# Patient Record
Sex: FEMALE | Race: WHITE | Employment: UNEMPLOYED | URBAN - METROPOLITAN AREA
[De-identification: names, ages, dates, MRNs, and addresses within clinical notes are randomized per-mention and may not be internally consistent; named-entity substitution may affect disease eponyms.]

---

## 2017-04-27 ENCOUNTER — HOSPITAL ENCOUNTER (OUTPATIENT)
Dept: PHYSICAL THERAPY | Age: 52
Discharge: HOME | End: 2017-04-27
Payer: COMMERCIAL

## 2017-04-27 PROCEDURE — 97162 PT EVAL MOD COMPLEX 30 MIN: CPT

## 2017-04-27 PROCEDURE — 97530 THERAPEUTIC ACTIVITIES: CPT

## 2017-04-27 NOTE — PROGRESS NOTES
PF EVALUATION/TREATMENT NOTE     Patient Name: Ashlie Min  Date:2017  : 1965  [x]  Patient  Verified  Payor: Pauline Alford / Plan: Mitch Adan / Product Type: HMO /    In time:500  Out time:600  Total Treatment Time (min): 60    Visit #: 1 of 8    Treatment Area: [x] Pelvic Floor     [] Other:    SUBJECTIVE  Any medication changes, allergies to medications, adverse drug reactions, diagnosis change, or new procedure performed?: [x] No    [] Yes (see summary sheet for update)    Pain Level : At Present: 0/10, At [facility] 0/10, At Worst 0/10   [] Constant []Intermittent    []Improving  [x]Staying the Same  []Getting worse    Current symptoms/Complaints: Prior to January was having diarrhea twice daily, no leaking, frequent urge especially around meals. Went off Metformin (which she believes caused it) in January and saw onset of feeling \"full\"  Requiring strain and leaning back to void began in January, at first with constipation and use of exlax and had stomach pressure. Was having blood from hemorrhoids d/t strain, felt like the whole bottom was going to blow out (like a balloon coming out)   A month ago began Metamucil and increased regularity of void and less abdominal pain, but increased fullness.      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), color/crafts, being outside/yardwork  Limitations to PLOF: metformin had her feeling like she couldn't do anything   Mechanism of Injury: patient relates it to medicine  Previous Treatment/Compliance: Metamucil daily with relief      Work Hx: Housewife  Living Situation:  at home, 2 grown kids at home   Domestic Life: housework  Activity/Recreational Limitations: not     Pt Goals: poop  Barriers:   [x]N/A []pain []financial []time []transportation []other  Motivation: good  Substance use:  [x]N/A  []Alcohol []Tobacco []other:   FABQ Score: []low [x]elevate 43  Cognition: A & O x 4    Other:  Clinical Presentation:   [] Stable  [x]Evolving/Changing   [] Unstable/unpredictable     OBJECTIVE     Pelvic Floor Dysfunction Evaluation    Musculoskeletal Screen:    Skin Integrity:  [x] Healthy Reported thinning noted     Sensation: [x] Intact [] Diminished:        Prolapse: [x] firm urethra  [] Rectocele:    Vaginal  PERF Score (Performance/Endurance/Repetitions/Flicks)   P: 1 E:2 R: 4 F: 3 Total:glut squeeze     Rectal:  Poor awareness, able to contract with stuttered activation, improved on third attempt but unable to maintain     Accessory Muscle Use: add, gluts, breath    Patient has failed previous pelvic floor muscle training? [] Yes    [x] No    Lumbar Screen: full flexion, limited gapping lumbar lower  ROM deficits: decreased right ? ER>IR  Breath Patterns: upper chest, cues for improved diaphragm  Palpation: right diaphragm, left of center lower abdomen, right > left adductors  Internal vaginal left deep pelvic floor, rectal did well - left greater than right tension   Daily Water Intake: 6-8 glasses of water, coffee once in a while (decaf), unsweet tea  Bowel Habits: daily with much strain,     30 min [x]Eval                  []Re-Eval            15 min Therapeutic Activity:  [x]  See HEP  sheet :  Pt instructed in pelvic floor anatomy/function related to   Constipation,  VOIDING     Patient also instructed in behavior modifications relative to symptoms including dietary intake, bed mobility/body mechanics. Educated in use of step stool for foot support during defecation to facilitate relaxation of NC mm as indicated.           []  Increase Tissue extensibility        []  Assess fiber intake    []  Assess voiding habits  []  Assess bowel habits  []  Other:   Rationale: improve coordination, increase proprioception and pelvic awareness   to improve the patients ability to regain proper voiding       15 min Neuromuscular Re-education:  [x]  See flow sheet :  Self stretches to inhibit tone at hips/pelvic; breathing for same   []  Pelvic floor strengthening                 [x]  Pelvic floor downtraining  []  Quality pelvic floor contractions       [x]  Relaxation techniques  []  Urge suppression exercises  []  Other:  Rationale: improve coordination and increase proprioception  to improve the patients ability to regain voiding mechanics             With   [] TE   [x] TA   [x] neuro  [] manual   [] other: Patient Education: [x] Review HEP    [] Progressed/Changed HEP based on:   [] positioning   [] body mechanics   [] transfers   [] heat/ice application    [] other:      Other Objective/Functional Measures:     Pain Level (0-10 scale) post treatment: 0    ASSESSMENT:     [x]  See Plan of Care  []  See progress note/recertification  []  See Discharge Summary         Progress towards goals / Updated goals:  Short Term Goals: To be accomplished in 4  weeks:  1. Patient will demonstrate home exercise program accurately as adjunct to PT clinic visits to promote healthy lifestyle and increase quality of life. 2. Patient will verbalize proper voiding mechanics, as well complete bowel logs  3. Patient will perform biofeedback for further assessment and retraining of PF for ease of voiding     Long Term Goals: To be accomplished in 8  weeks:  1. Patient will have FOTO score of 59 points indicating improvement in function(49 at eval)  2. Patient will demonstrate proper PF activation as tested rectally to allow coordinated  activation and hold with full relaxation to allow awareness required for proper voiding  3.  Patient will report 75% decrease in symptoms to allow improved voiding without strain or pressure       PLAN  []  Upgrade activities as tolerated     [x]  Continue plan of care  []  Update interventions per flow sheet       [] Discharge due to:_  []  Other:_      Naveed Kendall, PT 4/27/2017  5:14 PM

## 2017-04-27 NOTE — PROGRESS NOTES
[facility] [address], [address] [phone] fax    Plan of Care/ Statement of Necessity for Physical Therapy Services    Patient name: Marla Alfaro Start of Care: 2017   Referral source: Sheryl Camejo MD : 1965    Medical Diagnosis: Outlet dysfunction constipation [K59.02]   Onset Date:2017    Treatment Diagnosis: pelvic floor dyscoordination   Prior Hospitalization: see medical history Provider#: [id number]   Medications: Verified on Patient summary List    Comorbidities: PMHx/Surgical Hx: 2 c-sections 94,96, hysterectomy 08 hormone therapy x 1 year  DM ~ 7 years, HTN, depression/anxity    Prior Level of Function: walking 1 1/2 miles a day to control sugars, 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 Assessment/ key information: Patient is a 46 y. o.female presenting with Outlet dysfunction constipation [K59.02]. Ms. Karen Bolaños was a pleasure to have evaluated for complaints of \"fullness\" and strain with voiding over the last 4 month. She notes history of diarrhea while on Metformin, and when taken off saw a period of constipation and pressure at abdomen, improving over the last month with use of fiber supplement. Presently she is voiding once daily, but with a great deal of strain and discomfort. Evaluation revealed abdominal soft tissue restrictions, limited hip joint and soft tissue mobility and tightness through pelvic floor. Intrarectal evaluation revealed poor activation with difficulty on full relaxation. She is eager to improve and willing to work with PT to address voiding mechanics, soft tissue restrictions and utilize biofeedback for pelvic floor reeducation.   Patient will benefit from skilled PT services to address deficits and facilitate return to premorbid activity level and promote improved quality of life. Evaluation Complexity History HIGH Complexity :3+ comorbidities / personal factors will impact the outcome/ POC ; Examination MEDIUM Complexity : 3 Standardized tests and measures addressing body structure, function, activity limitation and / or participation in recreation  ;Presentation MEDIUM Complexity : Evolving with changing characteristics  ; Clinical Decision Making MEDIUM Complexity : FOTO score of 26-74  Overall Complexity Rating: MEDIUM    Problem List: Pelvic pain/dysfunction, Decreased pelvic floor mm awareness, Decreased pelvic floor mm strength, Use of accessory muscles, Improper voiding habits, Hypertonus of pelvic floor and Urinary urgency    Treatment Plan may include any combination of the following:   Therapeutic exercise, Urge suppression techniques, Neuromuscular re-education, Manual therapy, Physical agent/modality and Patient education  Patient / Family readiness to learn indicated by: asking questions, trying to perform skills and interest    Persons(s) to be included in education: patient (P)    Barriers to Learning/Limitations: None    Patient Goal (s): poop    Patient Self Reported Health Status: good    Rehabilitation Potential: good    Short Term Goals: To be accomplished in 4  weeks:  1. Patient will demonstrate home exercise program accurately as adjunct to PT clinic visits to promote healthy lifestyle and increase quality of life. 2. Patient will verbalize proper voiding mechanics, as well complete bowel logs  3. Patient will perform biofeedback for further assessment and retraining of PF for ease of voiding     Long Term Goals: To be accomplished in 8  weeks:  1. Patient will have FOTO score of 59 points indicating improvement in function(49 at eval)  2. Patient will demonstrate proper PF activation as tested rectally to allow coordinated  activation and hold with full relaxation to allow awareness required for proper voiding  3.  Patient will report 75% decrease in symptoms to allow improved voiding without strain or pressure     Frequency / Duration: Patient to be seen 1-2 times per week for 8 weeks. Patient/ Caregiver education and instruction: Diagnosis, prognosis, Proper Voiding Habits, Diet, Pain Management, Exercises and Bladder Retraining   [x]  Plan of care has been reviewed with ANNA Severino, PT 4/27/2017 5:13 PM    ________________________________________________________________________    I certify that the above Therapy Services are being furnished while the patient is under my care. I agree with the treatment plan and certify that this therapy is necessary.     Physician's Signature:___________________  Date:____________Time: _________    Please sign and return to [facility] [address]   [facility], [address] [phone] fax

## 2017-04-27 NOTE — MR AVS SNAPSHOT
Visit Information Date & Time Provider Department Dept. Phone Encounter #  
 4/27/2017  5:00 PM Ronni Gann, PT [facility] PT [facility]  [phone] [id number] Upcoming Health Maintenance Date Due Hepatitis C Screening 1965 DTaP/Tdap/Td series (1 - Tdap) 5/15/1984 PAP AKA CERVICAL CYTOLOGY 5/15/1984 BREAST CANCER SCRN MAMMOGRAM 5/15/2013 FOBT Q 1 YEAR AGE 50-75 5/15/2013 INFLUENZA AGE 9 TO ADULT 8/1/2016 Allergies as of 4/27/2017  Never Reviewed Not on File Current Immunizations  Never Reviewed No immunizations on file. Not reviewed this visit Your Updated Medication List  
  
Notice Cannot display patient medications because the patient has not yet been checked in. To-Do List   
 04/27/2017  5:00 PM  
  Appointment with Ronni Gann PT at [facility] PT [facility] ([phone]) Introducing hospitals SERVICES! [employer] introduces [employer] patient portal. Now you can access parts of your medical record, email your doctor's office, and request medication refills online. 1. In your internet browser, go to [email or web address] 2. Click on the First Time User? Click Here link in the Sign In box. You will see the New Member Sign Up page. 3. Enter your [employer] Access Code exactly as it appears below. You will not need to use this code after youve completed the sign-up process. If you do not sign up before the expiration date, you must request a new code. · [employer] Access Code: [email or web address] Expires: 7/13/2017 12:40 PM 
 
4. Enter the last four digits of your Social Security Number (xxxx) and Date of Birth (mm/dd/yyyy) as indicated and click Submit. You will be taken to the next sign-up page. 5. Create a [employer] ID. This will be your [employer] login ID and cannot be changed, so think of one that is secure and easy to remember. 6. Create a [employer] password. You can change your password at any time. 7. Enter your Password Reset Question and Answer. This can be used at a later time if you forget your password. 8. Enter your e-mail address. You will receive e-mail notification when new information is available in [address] 9. Click Sign Up. You can now view and download portions of your medical record. 10. Click the Download Summary menu link to download a portable copy of your medical information. If you have questions, please visit the Frequently Asked Questions section of the [employer] website. Remember, [employer] is NOT to be used for urgent needs. For medical emergencies, dial 911. Now available from your iPhone and Android! Please provide this summary of care documentation to your next provider. Your primary care clinician is listed as GREG ESPINOZA. If you have any questions after today's visit, please call [phone].

## 2017-05-02 ENCOUNTER — HOSPITAL ENCOUNTER (OUTPATIENT)
Dept: PHYSICAL THERAPY | Age: 52
Discharge: HOME | End: 2017-05-02
Payer: COMMERCIAL

## 2017-05-02 PROCEDURE — 97530 THERAPEUTIC ACTIVITIES: CPT

## 2017-05-02 PROCEDURE — 97112 NEUROMUSCULAR REEDUCATION: CPT

## 2017-05-09 ENCOUNTER — HOSPITAL ENCOUNTER (OUTPATIENT)
Dept: PHYSICAL THERAPY | Age: 52
Discharge: HOME | End: 2017-05-09
Payer: COMMERCIAL

## 2017-05-09 PROCEDURE — 97110 THERAPEUTIC EXERCISES: CPT

## 2017-05-09 PROCEDURE — 97112 NEUROMUSCULAR REEDUCATION: CPT

## 2017-05-09 NOTE — PROGRESS NOTES
PF DAILY TREATMENT NOTE 3-16    Patient Name: Seema Peralta  Date:2017  : 1965  [x]  Patient  Verified  Payor: Adry Guzman / Plan: Tucker Challenger / Product Type: HMO /    In time:1025  Out time:1115  Total Treatment Time (min): 40  Total Timed Codes (min): 40  1:1 Treatment Time ( only):    Visit #: 3 of 8    Treatment Area: [] Pelvic Floor     [] Other:    SUBJECTIVE  Pain Level (0-10 scale): 3/10, pain everywhere  Any medication changes, allergies to medications, adverse drug reactions, diagnosis change, or new procedure performed?: [x] No    [] Yes (see summary sheet for update)  Subjective functional status/changes:   [] No changes reported  Pt reports that she has bowel movements daily but has frequent trips to urinate as well (as confirmed by bladder logs). Pt reports that she always stops at the bathroom before she leaves the house. She adds that she has been only doing a little bit of straining to get her bowel movement going as they are still really firm.     OBJECTIVE          15 min Therapeutic Activity:  []  See flow sheet :    []  Increase Tissue extensibility        []  Assess fiber intake    [x]  Assess voiding habits  [x]  Assess bowel habits  [x]  Other: Relaxed voids, urge suppression  Rationale: improve coordination and increase proprioception  to improve the patients ability to improve pelvic floor relaxation to promote ease of voids      25 min Neuromuscular Re-education:  []  See flow sheet :   [x]  Pelvic floor strengthening                 [x]  Pelvic floor downtraining  [x]  Quality pelvic floor contractions       [x]  Relaxation techniques  [x]  Urge suppression exercises  [x]  Other:biofeedback, guided relaxation, toilet simulation  Rationale: increase ROM, improve coordination and increase proprioception  to improve the patients ability to facilitate ease of voiding, both bladder and bowel          With   [] TE   [x] TA   [x] neuro  [] manual   [] other: Patient Education: [x] Review HEP    [] Progressed/Changed HEP based on:   [x] positioning   [x] body mechanics   [] transfers   [] heat/ice application    [] other:      Other Objective/Functional Measures:   []baseline resting tone: .6  []slow twitch mms 3.8  []fast twitch mms    Pain Level (0-10 scale) post treatment: 1/10    ASSESSMENT/Changes in Function: Pt continues to demonstrate difficulty with relaxation however following today's biofeedback and guided breathing, relaxation, pt was able to report improved relaxation and therapist noted improved breathing. She also reported decreased pain throughout her body    []  Decrease # of leaks   [] No change []  Improving [] Resolved     []  Decrease hypertonus [] No change []  Improving [] Resolved     []  Increase void interval [] No change []  Improving [] Resolved     []  Increase PF strength [] No change []  Improving [] Resolved     []  Increase PF endurance [] No change []  Improving [] Resolved     []  Increase endurance [] No change []  Improving [] Resolved     []  Decrease # of pads [] No change []  Improving [] Resolved     []  Decrease pain [] No change []  Improving [] Resolved     [x]  Increased coordination [] No change [x]  Improving [] Resolved     [x]  Increased Bowel Frequency [] No change [x]  Improving [] Resolved       Patient will continue to benefit from skilled PT services to address functional mobility deficits, address ROM deficits, address strength deficits and analyze and address soft tissue restrictions to attain remaining goals. []  See Plan of Care  []  See progress note/recertification  []  See Discharge Summary         Progress towards goals / Updated goals:  Short Term Goals: To be accomplished in 4 weeks:  1. Patient will demonstrate home exercise program accurately as adjunct to PT clinic visits to promote healthy lifestyle and increase quality of life. Current: Pr reports compliance   2.  Patient will verbalize proper voiding mechanics, as well complete bowel logs  Current: Pt returned bowel logs and verbalizes proper voiding mechanics, progressing however still requires guidance as still straining  3. Patient will perform biofeedback for further assessment and retraining of PF for ease of voiding   Current: Pt performed biofeedback today with improved understanding of relaxed voids      Long Term Goals: To be accomplished in 8 weeks:  1. Patient will have FOTO score of 59 points indicating improvement in function(49 at eval)  2. Patient will demonstrate proper PF activation as tested rectally to allow coordinated activation and hold with full relaxation to allow awareness required for proper voiding  Current: will assess at next visit however pt demonstrates good relaxation in supine and seated positions while completing biofeedback  3. Patient will report 75% decrease in symptoms to allow improved voiding without strain or pressure   Current: Pt reported straining this morning but tried to do less.      PLAN  []  Upgrade activities as tolerated     [x]  Continue plan of care  []  Update interventions per flow sheet       []  Discharge due to:_  []  Other:_      Cindy Renee PTA 5/9/2017  10:56 AM    Future Appointments  Date Time Provider Magdiel Elliott   5/18/2017 9:00 AM Sachin Buenrostro, PT [facility]   5/23/2017 10:30 AM Cindy Renee PTA [facility]   5/30/2017 10:30 AM Cindy Renee PTA [facility]

## 2017-05-18 ENCOUNTER — HOSPITAL ENCOUNTER (OUTPATIENT)
Dept: PHYSICAL THERAPY | Age: 52
Discharge: HOME | End: 2017-05-18
Payer: COMMERCIAL

## 2017-05-18 PROCEDURE — 97530 THERAPEUTIC ACTIVITIES: CPT

## 2017-05-18 PROCEDURE — 97112 NEUROMUSCULAR REEDUCATION: CPT

## 2017-05-18 NOTE — PROGRESS NOTES
[facility] [address] [facility] [address], [address] [phone] fax    Physician Update  [x] Progress Note  [] Discharge Summary    Patient name: Seema Peralta Start of Care: 2017   Referral source: Leola Tee MD : 1965    Medical Diagnosis: Outlet dysfunction constipation [K59.02] Onset Date:2017    Treatment Diagnosis: pelvic floor dyscoordination   Prior Hospitalization: see medical history Provider#: [id number]   Medications: Verified on Patient summary List   Comorbidities: PMHx/Surgical Hx: 2 c-sections 94,96, hysterectomy 08 hormone therapy x 1 year  DM ~ 7 years, HTN, depression/anxity   Prior Level of Function: walking 1 1/2 miles a day to control sugars, 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 o.female presenting with Outlet dysfunction constipation [K59.02]. Ms. Janeth Pineda was a pleasure to have evaluated for complaints of \"fullness\" and strain with voiding over the last 4 month. She notes history of diarrhea while on Metformin, and when taken off saw a period of constipation and pressure at abdomen, improving over the last month with use of fiber supplement. Presently she is voiding once daily, but with a great deal of strain and discomfort. Evaluation revealed abdominal soft tissue restrictions, limited hip joint and soft tissue mobility and tightness through pelvic floor. Intrarectal evaluation revealed poor activation with difficulty on full relaxation. She is eager to improve and willing to work with PT to address voiding mechanics, soft tissue restrictions and utilize biofeedback for pelvic floor reeducation. Patient will benefit from skilled PT services to address deficits and facilitate return to premorbid activity level and promote improved quality of life.      Progress towards Goals, unmet goals remain appropriate:   Short Term Goals: To be accomplished in 4 weeks:  1. Patient will demonstrate home exercise program accurately as adjunct to PT clinic visits to promote healthy lifestyle and increase quality of life. Current: MET   2. Patient will verbalize proper voiding mechanics, as well complete bowel logs  Current: PROGRESSING - verbalizes proper voiding, but still struggles with sull relaxation   3. Patient will perform biofeedback for further assessment and retraining of PF for ease of voiding   Current: MET       Long Term Goals: To be accomplished in 8 weeks:  1. Patient will have FOTO score of 59 points indicating improvement in function(49 at eval)  MET 60  2. Patient will demonstrate proper PF activation as tested rectally to allow coordinated activation and hold with full relaxation to allow awareness required for proper voiding  Current: improved activation noted, still very weak and difficulty full relaxing. 3. Patient will report 75% decrease in symptoms to allow improved voiding without strain or pressure   Current: 30%      ASSESSMENT/RECOMMENDATIONS:  Ms. Marielena Whelan has seen fair to good initial response to PFPT addressing her complaints of constipation. We have initiated biofeedback to improve PF coordination, as well as emphasizing abdominal/trunk/hip flexibility and relaxation techniques to allow ease of voiding. She is very restricted and will require continued emphasis in this area. We will continue for another month, 1-2 times weekly, at this point scheduling once weekly with heavy emphasis on HEP.      [x]Continue therapy per initial plan/protocol at a frequency of  1 x per week for 4 weeks  []Continue therapy with the following recommended changes:_____________________      _____________________________________________________________________  []Discontinue therapy progressing towards or have reached established goals  []Discontinue therapy due to lack of appreciable progress towards goals  []Discontinue therapy due to lack of attendance or compliance  []Await Physician's recommendations/decisions regarding therapy  []Other:________________________________________________________________    Thank you for this referral.   Shawn Vogt, PT 5/18/2017 9:11 AM  NOTE TO PHYSICIAN:  PLEASE COMPLETE THE ORDERS BELOW AND   FAX TO [facility]: [phone]  If you are unable to process this request in 24 hours please contact our office: [phone]    []  I have read the above report and request that my patient continue as recommended. []  I have read the above report and request that my patient continue therapy with the following changes/special instructions:________________________________________  []I have read the above report and request that my patient be discharged from therapy.     Physicians signature: ______________________________Date: _____Time:_______

## 2017-05-18 NOTE — PROGRESS NOTES
PF DAILY TREATMENT NOTE 3-16    Patient Name: Naivn Bhatia  Date:2017  : 1965  [x]  Patient  Verified  Payor: Clotilda Meckel / Plan: Jono León / Product Type: HMO /    In time:900  Out time:945  Total Treatment Time (min): 45  Visit #: 4 of 8    Treatment Area: [x] Pelvic Floor     [] Other:    SUBJECTIVE  Pain Level (0-10 scale): 0  Any medication changes, allergies to medications, adverse drug reactions, diagnosis change, or new procedure performed?: [x] No    [] Yes (see summary sheet for update)  Subjective functional status/changes:   [] No changes reported  I am stiff all over, I was in the car for 8 hours on Monday and again yesterday on a business trip.    I am having a BM every day, but I am still straining,     OBJECTIVE      10 min Therapeutic Activity:  [x]  See flow sheet :  Reviewed voiding relaxation and position,    [] Increase Tissue extensibility [] Assess fiber intake   [x] Assess voiding habits  [x] Assess bowel habits  [x] Other: Relaxed voids, urge suppression  Rationale: improve coordination and increase proprioception to improve the patients ability to improve pelvic floor relaxation to promote ease of voids      32 min Neuromuscular Re-education:  [x]  See flow sheet :  Intrarectal relaxation technique for CO left sided  Self stretches for inhibitiing tone and relaxation including addition of modified happy baby  Biofeedback rest with breathing , slow 3:17   [x] Pelvic floor strengthening [x] Pelvic floor downtraining  [x] Quality pelvic floor contractions [x] Relaxation techniques  [x] Urge suppression exercises  [x] Other:biofeedback, guided relaxation, toilet simulation  Rationale: increase ROM, improve coordination and increase proprioception to improve the patients ability to facilitate ease of voiding, both bladder and bowel              With   [] TE   [x] TA   [x] neuro  [] manual   [] other: Patient Education: [x] Review HEP    [] Progressed/Changed HEP based on:   [] positioning   [] body mechanics   [] transfers   [] heat/ice application    [] other:      Other Objective/Functional Measures:   [x]baseline resting tone: 4.4  [x]slow twitch mms 8.6 average, 23.4 max, rest back to 4.7       Pain Level (0-10 scale) post treatment: 0    ASSESSMENT  [x]  See progress note/recertification  []  See Discharge Summary         Progress towards goals / Updated goals:  Short Term Goals: To be accomplished in 4 weeks:  1. Patient will demonstrate home exercise program accurately as adjunct to PT clinic visits to promote healthy lifestyle and increase quality of life. Current: MET   2. Patient will verbalize proper voiding mechanics, as well complete bowel logs  Current: PROGRESSING - verbalizes proper voiding, but still struggles with sull relaxation   3. Patient will perform biofeedback for further assessment and retraining of PF for ease of voiding   Current: MET       Long Term Goals: To be accomplished in 8 weeks:  1. Patient will have FOTO score of 59 points indicating improvement in function(49 at eval)  MET 60  2. Patient will demonstrate proper PF activation as tested rectally to allow coordinated activation and hold with full relaxation to allow awareness required for proper voiding  Current: improved activation noted, still very weak and difficulty full relaxing.    3. Patient will report 75% decrease in symptoms to allow improved voiding without strain or pressure   Current: 30%    PLAN  []  Upgrade activities as tolerated     [x]  Continue plan of care  []  Update interventions per flow sheet       []  Discharge due to:_  []  Other:_      Jamaal Lobato, PT 5/18/2017  9:01 AM    Future Appointments  Date Time Provider Magdiel Elliott   5/23/2017 10:30 AM Prosper Kowalski PTA [facility]   5/30/2017 10:30 AM [facility]

## 2017-05-23 ENCOUNTER — HOSPITAL ENCOUNTER (OUTPATIENT)
Dept: PHYSICAL THERAPY | Age: 52
Discharge: HOME | End: 2017-05-23
Payer: COMMERCIAL

## 2017-05-23 PROCEDURE — 97112 NEUROMUSCULAR REEDUCATION: CPT

## 2017-05-23 PROCEDURE — 97530 THERAPEUTIC ACTIVITIES: CPT

## 2017-05-23 NOTE — PROGRESS NOTES
PF DAILY TREATMENT NOTE 3-16    Patient Name: Carmen Rodriguez  Date:2017  : 1965  [x]  Patient  Verified  Payor: Brian How / Plan: Davon Payne / Product Type: HMO /    In time:1038  Out time:1115  Total Treatment Time (min): 37  Total Timed Codes (min): 37  1:1 Treatment Time ( [address] only):     Visit #: 5 of 8    Treatment Area: [] Pelvic Floor     [] Other:    SUBJECTIVE  Pain Level (0-10 scale): 7/10 everywhere  Any medication changes, allergies to medications, adverse drug reactions, diagnosis change, or new procedure performed?: [x] No    [] Yes (see summary sheet for update)  Subjective functional status/changes:   [] No changes reported  Pt reports that she hurts everywhere because of the weather. She states that she had several bowel movements after the last treatment and her bowel frequency and relaxation is better but she is still having difficulty emptying in the last few days.     OBJECTIVE           10 min Therapeutic Activity:  [x]  See flow sheet :voiding behaviors    [x]  Increase Tissue extensibility        []  Assess fiber intake    [x]  Assess voiding habits  [x]  Assess bowel habits  [x]  Other:constipation management   Rationale: increase ROM, improve coordination and increase proprioception  to improve the patients ability to improve bowel motility      27 min Neuromuscular Re-education:  [x]  See flow sheet :neuromuscular relaxation technique to improve emptying bowel and biofeedback   [x]  Pelvic floor strengthening                 [x]  Pelvic floor downtraining  [x]  Quality pelvic floor contractions       [x]  Relaxation techniques  []  Urge suppression exercises  []  Other:  Rationale: increase ROM, improve coordination and increase proprioception  to improve the patients ability to improve pelvic floor relaxation to empty bowels          With   [] TE   [] TA   [x] neuro  [] manual   [] other: Patient Education: [x] Review HEP    [] Progressed/Changed HEP based on: [] positioning   [] body mechanics   [] transfers   [] heat/ice application    [] other:      Other Objective/Functional Measures:   [x]baseline resting tone: 2.1 mv  [x]slow twitch mms 3.6 mv (no improvement)  []fast twitch mms    Pain Level (0-10 scale) post treatment: 2-3/10  []  Increase PF endurance [] No change []  Improving [] Resolved     []  Increase endurance [] No change []  Improving [] Resolved     []  Decrease # of pads [] No change []  Improving [] Resolved     [x]  Decrease pain [] No change [x]  Improving [] Resolved     [x]  Increased coordination [] No change [x]  Improving [] Resolved     []  Increased Bowel Frequency [] No change []  Improving [] Resolved       Patient will continue to benefit from skilled PT services to address functional mobility deficits, address ROM deficits, address strength deficits and analyze and address soft tissue restrictions to attain remaining goals. []  See Plan of Care  []  See progress note/recertification  []  See Discharge Summary         Progress towards goals / Updated goals:  Short Term Goals: To be accomplished in 4 weeks:  1. Patient will demonstrate home exercise program accurately as adjunct to PT clinic visits to promote healthy lifestyle and increase quality of life. Current: MET   2. Patient will verbalize proper voiding mechanics, as well complete bowel logs  Current: PROGRESSING - verbalizes proper voiding, but still struggles with full relaxation   3. Patient will perform biofeedback for further assessment and retraining of PF for ease of voiding   Current: MET       Long Term Goals: To be accomplished in 8 weeks:  1. Patient will have FOTO score of 59 points indicating improvement in function(49 at eval)  MET 60  2.  Patient will demonstrate proper PF activation as tested rectally to allow coordinated activation and hold with full relaxation to allow awareness required for proper voiding  Current: continued difficulty with contraction however improved with relaxation.    3. Patient will report 75% decrease in symptoms to allow improved voiding without strain or pressure   Current: Reduced per patient    PLAN  []  Upgrade activities as tolerated     []  Continue plan of care  []  Update interventions per flow sheet       []  Discharge due to:_  []  Other:_      Javier Aquino PTA 5/23/2017  1:02 PM    Future Appointments  Date Time Provider Magdiel Elliott   5/30/2017 10:30 AM Javier Aquino PTA [facility]

## 2017-05-30 ENCOUNTER — HOSPITAL ENCOUNTER (OUTPATIENT)
Dept: PHYSICAL THERAPY | Age: 52
Discharge: HOME | End: 2017-05-30
Payer: COMMERCIAL

## 2017-05-30 PROCEDURE — 97530 THERAPEUTIC ACTIVITIES: CPT

## 2017-05-30 PROCEDURE — 97140 MANUAL THERAPY 1/> REGIONS: CPT

## 2017-05-30 NOTE — PROGRESS NOTES
PF DAILY TREATMENT NOTE 3-16    Patient Name: Vivien Ernandez  Date:2017  : 1965  [x]  Patient  Verified  Payor: Nichelle Gordon / Plan: Chet Yang / Product Type: HMO /    In time:1038  Out time:1120  Total Treatment Time (min): 42  Total Timed Codes (min): 42  1:1 Treatment Time ( only):     Visit #: 6 of 8    Treatment Area: [] Pelvic Floor     [] Other:    SUBJECTIVE  Pain Level (0-10 scale): 0/10  Any medication changes, allergies to medications, adverse drug reactions, diagnosis change, or new procedure performed?: [x] No    [] Yes (see summary sheet for update)  Subjective functional status/changes:   [] No changes reported  Pt states that she thinks she is relaxing better and is emptying her bowels better like every 1.5 days. She states that she is 50% better since she started therapy.   She also states that her pain has been much improved and she hasn't had to strain last few bowel movements which she thinks is helping    OBJECTIVE                 10 min Therapeutic Activity:  [x]  See flow sheet :    [x]  Increase Tissue extensibility        []  Assess fiber intake    [x]  Assess voiding habits  [x]  Assess bowel habits  []  Other: urge management   Rationale: increase ROM, improve coordination and increase proprioception  to improve the patients ability to improve bowel motility      32 min Neuromuscular Re-education:  [x]  See flow sheet :Neuromuscular relaxation technique, intrarectal and biofeedback   [x]  Pelvic floor strengthening                 [x]  Pelvic floor downtraining  [x]  Quality pelvic floor contractions       [x]  Relaxation techniques  [x]  Urge suppression exercises  []  Other:  Rationale: increase ROM, increase strength, improve coordination and increase proprioception  to improve the patients ability to improve proprioception of pelvic floor muscles to improve bowel emptying            With   [] TE   [x] TA   [] neuro  [x] manual   [] other: Patient Education: [x] Review HEP    [] Progressed/Changed HEP based on:   [] positioning   [] body mechanics   [] transfers   [] heat/ice application    [] other:      Other Objective/Functional Measures:   [x]baseline resting tone: 2.0 mv  [x]slow twitch mms 5.8 mv  []fast twitch mms    Pain Level (0-10 scale) post treatment: 0/10    ASSESSMENT/Changes in Function: Pt demonstrates continued difficulty mari and relaxing pelvic floor muscles however she does demonstrate improved consistency. She does demonstrate improved relaxation as well as demonstrated by increased ease with voiding, increased bowel frequency and lack of straining with voids. Pt progressing nicely with goals and will benefit from a few more visits to help improve proprioception of pelvic floor muscles to improve pelvic floor function    []  Decrease # of leaks   [] No change []  Improving [] Resolved     []  Decrease hypertonus [] No change []  Improving [] Resolved     []  Increase void interval [] No change []  Improving [] Resolved     []  Increase PF strength [] No change []  Improving [] Resolved     []  Increase PF endurance [] No change []  Improving [] Resolved     []  Increase endurance [] No change []  Improving [] Resolved     []  Decrease # of pads [] No change []  Improving [] Resolved     [x]  Decrease pain [] No change [x]  Improving [] Resolved     [x]  Increased coordination [] No change [x]  Improving [] Resolved     [x]  Increased Bowel Frequency [] No change [x]  Improving [] Resolved       Patient will continue to benefit from skilled PT services to address functional mobility deficits, address ROM deficits, address strength deficits and analyze and address soft tissue restrictions to attain remaining goals. []  See Plan of Care  []  See progress note/recertification  []  See Discharge Summary         Progress towards goals / Updated goals:  Short Term Goals: To be accomplished in 4 weeks:  1.  Patient will demonstrate home exercise program accurately as adjunct to PT clinic visits to promote healthy lifestyle and increase quality of life. Current: MET   2. Patient will verbalize proper voiding mechanics, as well complete bowel logs  Current: PROGRESSING - verbalizes proper voiding, but still struggles with full relaxation   3. Patient will perform biofeedback for further assessment and retraining of PF for ease of voiding   Current: MET       Long Term Goals: To be accomplished in 8 weeks:  1. Patient will have FOTO score of 59 points indicating improvement in function(49 at eval)  MET 60  2. Patient will demonstrate proper PF activation as tested rectally to allow coordinated activation and hold with full relaxation to allow awareness required for proper voiding  Current: continued difficulty with contraction however improved with relaxation. 3. Patient will report 75% decrease in symptoms to allow improved voiding without strain or pressure   Current: 50% decrease in symptoms   Functional Gains: Less pain with voiding, Less pain overall, Improved bowel emptying  Functional Deficits: Improved bowel frequency, Improved pelvic floor relaxation  % improvement: 50%  Pain   Average: 210       Best: 0/10     Worst: 4/10  Patient Goal: \"To get it (BM) out\"      PLAN  []  Upgrade activities as tolerated     [x]  Continue plan of care  []  Update interventions per flow sheet       []  Discharge due to:_  []  Other:_      Leida Hernandez, ANNA 5/30/2017  10:39 AM    No future appointments.

## 2017-06-06 ENCOUNTER — HOSPITAL ENCOUNTER (OUTPATIENT)
Dept: PHYSICAL THERAPY | Age: 52
Discharge: HOME | End: 2017-06-06
Payer: COMMERCIAL

## 2017-06-06 PROCEDURE — 97110 THERAPEUTIC EXERCISES: CPT

## 2017-06-06 PROCEDURE — 97530 THERAPEUTIC ACTIVITIES: CPT

## 2017-06-06 PROCEDURE — 97112 NEUROMUSCULAR REEDUCATION: CPT

## 2017-06-06 NOTE — PROGRESS NOTES
PF DAILY TREATMENT NOTE 3-16    Patient Name: Brijesh Mark  Date:2017  : 1965  [x]  Patient  Verified  Payor: Claudette Puller / Plan: Judith Cough / Product Type: HMO /    In time:933  Out time:1025  Total Treatment Time (min): 52  Total Timed Codes (min): 52  1:1 Treatment Time ( [address] only):     Visit #: 7 of 8    Treatment Area: [] Pelvic Floor     [] Other:    SUBJECTIVE  Pain Level (0-10 scale): 0/10  Any medication changes, allergies to medications, adverse drug reactions, diagnosis change, or new procedure performed?: [x] No    [] Yes (see summary sheet for update)  Subjective functional status/changes:   [] No changes reported  Pt reports that she had 4 bowel movements over past week with no straining reported. She reports that her pain has been lower over the last week. She reports walking up to 3 miles daily. She still feels frustrated because she isn't having a bowel movement daily. She states that she rode her 's motorcycle for the first time in a long time and didn't have a bowel movement for 2 days leading her to use stool softeners.     OBJECTIVE        10 min Therapeutic Exercise:  [] See flow sheet :trunk mobility   []  Pelvic floor strengthening                 [x]  Pelvic floor downtraining  []  Quality pelvic floor contractions       [x]  Relaxation techniques  []  Urge suppression exercises  []  Other:  Rationale: increase ROM, improve coordination and increase proprioception  to improve the patients ability to promote colonic motility and bowel emptying       15 min Therapeutic Activity:  [x]  See flow sheet :    [x]  Increase Tissue extensibility        [x]  Assess fiber intake    [x]  Assess voiding habits  [x]  Assess bowel habits  [x]  Other:relaxed voiding,    Rationale: increase ROM, improve coordination and increase proprioception  to improve the patients ability to promote relaxed pelvic floor to promote normal functional voiding habits      27 min Neuromuscular Re-education:  [x]  See flow sheet :neuromuscular facilitated  Release to diaphragm and pelvic floor muscles   [x]  Pelvic floor strengthening                 [x]  Pelvic floor downtraining  [x]  Quality pelvic floor contractions       [x]  Relaxation techniques  []  Urge suppression exercises  [x]  Other:  Rationale: increase ROM, increase strength, improve coordination and increase proprioception  to improve the patients ability to facilitate pelvic floor muscular release to empty bowels and bladder          With   [x] TE   [x] TA   [x] neuro  [] manual   [] other: Patient Education: [x] Review HEP    [] Progressed/Changed HEP based on:   [] positioning   [] body mechanics   [] transfers   [] heat/ice application    [x] other: relaxed voiding     Other Objective/Functional Measures:   2-/5 MMT with cueing    Pain Level (0-10 scale) post treatment: 0/10    ASSESSMENT/Changes in Function: Pt demonstrates improved facilitation of pelvic floor muscles as well as improved release when cued. She continues to require cueing to reduce compensatory activity of gluteals and abdominals. Pt continues to be challenged by stress and was educated extensively on practicing relaxed voiding and relaxation techniques throughout day. Pt does demonstrate improved bowel frequency without straining and reduced pain overall indicating progress.   []  Decrease # of leaks   [] No change []  Improving [] Resolved     [x]  Decrease hypertonus [] No change [x]  Improving [] Resolved     []  Increase void interval [] No change []  Improving [] Resolved     [x]  Increase PF strength [] No change [x]  Improving [] Resolved     []  Increase PF endurance [] No change []  Improving [] Resolved     []  Increase endurance [] No change []  Improving [] Resolved     []  Decrease # of pads [] No change []  Improving [] Resolved     [x]  Decrease pain [] No change [x]  Improving [] Resolved     [x]  Increased coordination [] No change [x]  Improving [] Resolved     [x]  Increased Bowel Frequency [] No change [x]  Improving [] Resolved       Patient will continue to benefit from skilled PT services to address functional mobility deficits, address ROM deficits, address strength deficits and analyze and address soft tissue restrictions to attain remaining goals. []  See Plan of Care  []  See progress note/recertification  []  See Discharge Summary         Progress towards goals / Updated goals:  Short Term Goals: To be accomplished in 4 weeks:  1. Patient will demonstrate home exercise program accurately as adjunct to PT clinic visits to promote healthy lifestyle and increase quality of life. Current: MET   2. Patient will verbalize proper voiding mechanics, as well complete bowel logs  Current: PROGRESSING - verbalizes proper voiding, but still struggles with full relaxation   3. Patient will perform biofeedback for further assessment and retraining of PF for ease of voiding   Current: MET       Long Term Goals: To be accomplished in 8 weeks:  1. Patient will have FOTO score of 59 points indicating improvement in function(49 at eval)  MET 67 at last visit  2. Patient will demonstrate proper PF activation as tested rectally to allow coordinated activation and hold with full relaxation to allow awareness required for proper voiding  Current: Progressing, improved contraction and release   3. Patient will report 75% decrease in symptoms to allow improved voiding without strain or pressure   Current: Progressing with less strain and less pain noted.     PLAN  []  Upgrade activities as tolerated     [x]  Continue plan of care  []  Update interventions per flow sheet       []  Discharge due to:_  []  Other:_      Sadaf Roman PTA 6/6/2017  9:35 AM    Future Appointments  Date Time Provider Magdiel Elliott   6/6/2017 9:45 AM Sadaf Roman PTA [facility]   6/19/2017 12:00 PM Charisse Kraus PT [facility]   6/27/2017 11:15 AM Sadaf Roman PTA [facility]

## 2017-06-13 ENCOUNTER — APPOINTMENT (OUTPATIENT)
Dept: PHYSICAL THERAPY | Age: 52
End: 2017-06-13
Payer: COMMERCIAL

## 2017-06-19 ENCOUNTER — HOSPITAL ENCOUNTER (OUTPATIENT)
Dept: PHYSICAL THERAPY | Age: 52
Discharge: HOME | End: 2017-06-19
Payer: COMMERCIAL

## 2017-06-19 PROCEDURE — 97530 THERAPEUTIC ACTIVITIES: CPT

## 2017-06-19 NOTE — PROGRESS NOTES
[facility] [address], [address] [phone] fax    Discharge Summary  Patient name: Vivien Ernandez Start of Care: 2017   Referral source: Barby Mathias MD : 1965    Medical Diagnosis: Outlet dysfunction constipation [K59.02] Onset Date:2017    Treatment Diagnosis: pelvic floor dyscoordination   Prior Hospitalization: see medical history Provider#: [id number]   Medications: Verified on Patient summary List   Comorbidities: PMHx/Surgical Hx: 2 c-sections 94,96, hysterectomy 08 hormone therapy x 1 year  DM ~ 7 years, HTN, depression/anxity   Prior Level of Function: walking 1 1/2 miles a day to control sugars, 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 Ally Ni has done quite well with skilled PFPT to address complaints of constipation. She has seen great response to biofeedback and reeducation of PF activation and proper voiding mechanics. She was able to verbalize and demonstrate HEP, and techniques that she will need to continue to employ for good health going forward. Functional Gains: relaxation process, learning how o have patience and let my body to do its work,  Feel like the stomach is getting back to normal; i can go every day! Fewer laxatives, improved water and use of metamucil fiber   Functional Deficits: when i have to go to avoid not pushing.   % improvement: 80%  Pain   Average: 0/10       Best: 0/10     Worst: 0/10  Patient Goal: \"go to bathroom without pushing \"    Short Term Goals: To be accomplished in 4 weeks:  1. Patient will demonstrate home exercise program accurately as adjunct to PT clinic visits to promote healthy lifestyle and increase quality of life. Current: MET   2.  Patient will verbalize proper voiding mechanics, as well complete bowel logs  Current: MET   3. Patient will perform biofeedback for further assessment and retraining of PF for ease of voiding   Current: MET       Long Term Goals: To be accomplished in 8 weeks:  1. Patient will have FOTO score of 59 points indicating improvement in function(49 at eval)  MET 67 at last visit  2. Patient will demonstrate proper PF activation as tested rectally to allow coordinated activation and hold with full relaxation to allow awareness required for proper voiding  Current: Progressing, improved contraction and release, still 2/5 activation but good release and bulge without strain  3.  Patient will report 75% decrease in symptoms to allow improved voiding without strain or pressure   Current: MET         Thank you for this referral.    RECOMMENDATIONS:  [x]Discontinue therapy: [x]Patient has reached or is progressing toward set goals      []Patient is non-compliant or has abdicated      []Due to lack of appreciable progress towards set goals    Alex Browning, PT 6/19/2017 12:00 PM

## 2017-06-19 NOTE — PROGRESS NOTES
PF DAILY TREATMENT NOTE 3-16    Patient Name: Aneudy Zavala  Date:2017  : 1965  [x]  Patient  Verified  Payor: Swetha Monreal / Plan: [employer] / Product Type: HMO /    In time:1200  Out time:1220  Total Treatment Time (min): 20  Visit #: 8 of 8    Treatment Area: [x] Pelvic Floor     [] Other:    SUBJECTIVE  Pain Level (0-10 scale): 0  Any medication changes, allergies to medications, adverse drug reactions, diagnosis change, or new procedure performed?: [x] No    [] Yes (see summary sheet for update)  Subjective functional status/changes:   [] No changes reported  I have been great, going almost every day. OBJECTIVE       20 min Therapeutic Activity:  [x]  See flow sheet :  HEP review for long term self care    [x] Increase Tissue extensibility [x] Assess fiber intake   [x] Assess voiding habits  [x] Assess bowel habits  [x] Other:relaxed voiding,    Rationale: increase ROM, improve coordination and increase proprioception to improve the patients ability to promote relaxed pelvic floor to promote normal functional voiding habits              With   [] TE   [x] TA   [] neuro  [] manual   [] other: Patient Education: [x] Review HEP, POC    [] Progressed/Changed HEP based on:   [] positioning   [] body mechanics   [] transfers   [] heat/ice application    [] other:      Other Objective/Functional Measures:   2/5 rectal PF strength, good relaxation, no trigger points, still tight     Pain Level (0-10 scale) post treatment: 0    ASSESSMENT  []  See Plan of Care  []  See progress note/recertification  [x]  See Discharge Summary         Progress towards goals / Updated goals:  1. Patient will demonstrate home exercise program accurately as adjunct to PT clinic visits to promote healthy lifestyle and increase quality of life. Current: MET   2. Patient will verbalize proper voiding mechanics, as well complete bowel logs  Current: MET   3.  Patient will perform biofeedback for further assessment and retraining of PF for ease of voiding   Current: MET       Long Term Goals: To be accomplished in 8 weeks:  1. Patient will have FOTO score of 59 points indicating improvement in function(49 at eval)  MET 67 at last visit  2. Patient will demonstrate proper PF activation as tested rectally to allow coordinated activation and hold with full relaxation to allow awareness required for proper voiding  Current: Progressing, improved contraction and release, still 2/5 activation but good release and bulge without strain  3.  Patient will report 75% decrease in symptoms to allow improved voiding without strain or pressure   Current: MET     PLAN  []  Upgrade activities as tolerated     []  Continue plan of care  []  Update interventions per flow sheet       [x]  Discharge due to:_goals met  []  Other:_      Dea Bernabe PT 6/19/2017  12:00 PM    Future Appointments  Date Time Provider Magdiel Elliott   6/27/2017 11:15 AM Nieves Rodriguez PTA [facility]

## 2017-06-27 ENCOUNTER — APPOINTMENT (OUTPATIENT)
Dept: PHYSICAL THERAPY | Age: 52
End: 2017-06-27
Payer: COMMERCIAL